# Patient Record
Sex: MALE | Race: WHITE | NOT HISPANIC OR LATINO | Employment: FULL TIME | URBAN - METROPOLITAN AREA
[De-identification: names, ages, dates, MRNs, and addresses within clinical notes are randomized per-mention and may not be internally consistent; named-entity substitution may affect disease eponyms.]

---

## 2017-06-29 ENCOUNTER — APPOINTMENT (OUTPATIENT)
Dept: GENERAL RADIOLOGY | Facility: CLINIC | Age: 27
End: 2017-06-29
Attending: EMERGENCY MEDICINE
Payer: COMMERCIAL

## 2017-06-29 ENCOUNTER — HOSPITAL ENCOUNTER (EMERGENCY)
Facility: CLINIC | Age: 27
Discharge: HOME | End: 2017-06-29
Attending: EMERGENCY MEDICINE | Admitting: EMERGENCY MEDICINE
Payer: COMMERCIAL

## 2017-06-29 VITALS
OXYGEN SATURATION: 98 % | DIASTOLIC BLOOD PRESSURE: 85 MMHG | SYSTOLIC BLOOD PRESSURE: 122 MMHG | RESPIRATION RATE: 14 BRPM

## 2017-06-29 DIAGNOSIS — R07.89 ATYPICAL CHEST PAIN: ICD-10-CM

## 2017-06-29 DIAGNOSIS — M79.652 PAIN OF LEFT THIGH: ICD-10-CM

## 2017-06-29 LAB
ALBUMIN SERPL-MCNC: 4.5 G/DL (ref 3.4–5)
ALP SERPL-CCNC: 82 U/L (ref 40–150)
ALT SERPL W P-5'-P-CCNC: 55 U/L (ref 0–70)
ANION GAP SERPL CALCULATED.3IONS-SCNC: 8 MMOL/L (ref 3–14)
AST SERPL W P-5'-P-CCNC: 25 U/L (ref 0–45)
BASOPHILS # BLD AUTO: 0 10E9/L (ref 0–0.2)
BASOPHILS NFR BLD AUTO: 0.2 %
BILIRUB SERPL-MCNC: 0.6 MG/DL (ref 0.2–1.3)
BUN SERPL-MCNC: 12 MG/DL (ref 7–30)
CALCIUM SERPL-MCNC: 9.7 MG/DL (ref 8.5–10.1)
CHLORIDE SERPL-SCNC: 105 MMOL/L (ref 94–109)
CK SERPL-CCNC: 122 U/L (ref 30–300)
CO2 SERPL-SCNC: 27 MMOL/L (ref 20–32)
CREAT SERPL-MCNC: 0.74 MG/DL (ref 0.66–1.25)
DIFFERENTIAL METHOD BLD: NORMAL
EOSINOPHIL # BLD AUTO: 0.1 10E9/L (ref 0–0.7)
EOSINOPHIL NFR BLD AUTO: 1.5 %
ERYTHROCYTE [DISTWIDTH] IN BLOOD BY AUTOMATED COUNT: 12.2 % (ref 10–15)
GFR SERPL CREATININE-BSD FRML MDRD: NORMAL ML/MIN/1.7M2
GLUCOSE SERPL-MCNC: 83 MG/DL (ref 70–99)
HCT VFR BLD AUTO: 43.4 % (ref 40–53)
HGB BLD-MCNC: 14.7 G/DL (ref 13.3–17.7)
IMM GRANULOCYTES # BLD: 0 10E9/L (ref 0–0.4)
IMM GRANULOCYTES NFR BLD: 0.1 %
LIPASE SERPL-CCNC: 101 U/L (ref 73–393)
LYMPHOCYTES # BLD AUTO: 2.8 10E9/L (ref 0.8–5.3)
LYMPHOCYTES NFR BLD AUTO: 31.6 %
MCH RBC QN AUTO: 30 PG (ref 26.5–33)
MCHC RBC AUTO-ENTMCNC: 33.9 G/DL (ref 31.5–36.5)
MCV RBC AUTO: 89 FL (ref 78–100)
MONOCYTES # BLD AUTO: 0.7 10E9/L (ref 0–1.3)
MONOCYTES NFR BLD AUTO: 8.2 %
NEUTROPHILS # BLD AUTO: 5.2 10E9/L (ref 1.6–8.3)
NEUTROPHILS NFR BLD AUTO: 58.4 %
PLATELET # BLD AUTO: 253 10E9/L (ref 150–450)
POTASSIUM SERPL-SCNC: 3.8 MMOL/L (ref 3.4–5.3)
PROT SERPL-MCNC: 7.9 G/DL (ref 6.8–8.8)
RBC # BLD AUTO: 4.9 10E12/L (ref 4.4–5.9)
SODIUM SERPL-SCNC: 140 MMOL/L (ref 133–144)
TROPONIN I SERPL-MCNC: NORMAL UG/L (ref 0–0.04)
WBC # BLD AUTO: 8.9 10E9/L (ref 4–11)

## 2017-06-29 PROCEDURE — 25000132 ZZH RX MED GY IP 250 OP 250 PS 637: Performed by: EMERGENCY MEDICINE

## 2017-06-29 PROCEDURE — 84484 ASSAY OF TROPONIN QUANT: CPT | Performed by: EMERGENCY MEDICINE

## 2017-06-29 PROCEDURE — 80053 COMPREHEN METABOLIC PANEL: CPT | Performed by: EMERGENCY MEDICINE

## 2017-06-29 PROCEDURE — 83690 ASSAY OF LIPASE: CPT | Performed by: EMERGENCY MEDICINE

## 2017-06-29 PROCEDURE — 93005 ELECTROCARDIOGRAM TRACING: CPT

## 2017-06-29 PROCEDURE — 71020 XR CHEST 2 VW: CPT

## 2017-06-29 PROCEDURE — 85025 COMPLETE CBC W/AUTO DIFF WBC: CPT | Performed by: EMERGENCY MEDICINE

## 2017-06-29 PROCEDURE — 93010 ELECTROCARDIOGRAM REPORT: CPT | Performed by: EMERGENCY MEDICINE

## 2017-06-29 PROCEDURE — 99285 EMERGENCY DEPT VISIT HI MDM: CPT | Mod: 25

## 2017-06-29 PROCEDURE — 99284 EMERGENCY DEPT VISIT MOD MDM: CPT | Mod: 25 | Performed by: EMERGENCY MEDICINE

## 2017-06-29 PROCEDURE — 82550 ASSAY OF CK (CPK): CPT | Performed by: EMERGENCY MEDICINE

## 2017-06-29 RX ORDER — KETOROLAC TROMETHAMINE 30 MG/ML
30 INJECTION, SOLUTION INTRAMUSCULAR; INTRAVENOUS ONCE
Status: DISCONTINUED | OUTPATIENT
Start: 2017-06-29 | End: 2017-06-29 | Stop reason: CLARIF

## 2017-06-29 RX ORDER — ASPIRIN 81 MG/1
324 TABLET, CHEWABLE ORAL ONCE
Status: COMPLETED | OUTPATIENT
Start: 2017-06-29 | End: 2017-06-29

## 2017-06-29 RX ADMIN — ASPIRIN 81 MG 324 MG: 81 TABLET ORAL at 19:31

## 2017-06-29 ASSESSMENT — ENCOUNTER SYMPTOMS
WHEEZING: 0
FEVER: 0
CHEST TIGHTNESS: 0
TROUBLE SWALLOWING: 0
PALPITATIONS: 0
ABDOMINAL PAIN: 0
VOMITING: 0
DYSURIA: 0
NAUSEA: 0
SHORTNESS OF BREATH: 0
STRIDOR: 0
CHILLS: 0

## 2017-06-29 NOTE — ED AVS SNAPSHOT
[facility]    [address] [facility] [address]    Phone:  [phone]    Fax:  [phone]                                       Nicolás Baptiste   MRN: [id number]    Department:  [facility]   Date of Visit:  6/29/2017           Patient Information     Date Of Birth          1990        Your diagnoses for this visit were:     Atypical chest pain     Pain of left thigh        You were seen by Kirit Black MD and Kirit Land MD.      Follow-up Information     Follow up with Primary DrSolange MD.    Why:  As needed        Follow up with [facility].    Specialty:  EMERGENCY MEDICINE    Why:  If symptoms worsen    Contact information:    [address] [place] [id number] [phone]    Additional information:    The medical center is located at   [address] [facility]. (between [address] and   [address] in [place], [place] north   of [place]).        Discharge Instructions         Return if symptoms worsen or new symptoms develop.  Follow-up with primary care next week for recheck and possible further assessment if symptoms continue.  Any increased chest pain shortness of breath focal numbness weakness in any extremity or other symptoms present please return for further evaluation and care.     *CHEST PAIN, UNCERTAIN CAUSE    Based on your exam today, the exact cause of your chest pain is not certain. Your condition does not seem serious at this time, and your pain does not appear to be coming from your heart. However, sometimes the signs of a serious problem take more time to appear. Therefore, watch for the warning signs listed below.  HOME CARE:  1. Rest today and avoid strenuous activity.  2. Take any prescribed medicine as directed.  FOLLOW UP with your doctor in 1-3 days.   GET PROMPT MEDICAL ATTENTION if any of the following occur:    A change in the type of pain: if it feels different,  becomes more severe, lasts longer, or begins to spread into your shoulder, arm, neck, jaw or back    Shortness of breath or increased pain with breathing    Weakness, dizziness, or fainting    Cough with blood or dark colored sputum (phlegm)    Fever over 101  F (38.3  C)    Swelling, pain or redness in one leg    [address] [place] [address]. All rights reserved. This information is not intended as a substitute for professional medical care. Always follow your healthcare professional's instructions.    *CHEST PAIN, NONCARDIAC    Based on your visit today, the exact cause of your chest pain is not certain. Your condition does not seem serious and your pain does not appear to be coming from your heart. However, sometimes the signs of a serious problem take more time to appear. Therefore, please watch for the warning signs listed below.  HOME CARE:  3. Rest today and avoid strenuous activity.  4. Take any prescribed medicine as directed.  FOLLOW UP with your doctor in 1-3 days.   GET PROMPT MEDICAL ATTENTION if any of the following occur:    A change in the type of pain: if it feels different, becomes more severe, lasts longer, or begins to spread into your shoulder, arm, neck, jaw or back    Shortness of breath or increased pain with breathing    Cough with blood or dark colored sputum (phlegm)    Weakness, dizziness, or fainting    Fever over 101  F (38.3  C)    Swelling, pain or redness in one leg    [address] [place] [address]. All rights reserved. This information is not intended as a substitute for professional medical care. Always follow your healthcare professional's instructions.      24 Hour Appointment Hotline       To make an appointment at any [facility], call [phone] ([phone]). If you don't have a family doctor or clinic, we will help you find one. [facility] are conveniently located to serve the  needs of you and your family.             Review of your medicines      Notice     You have not been prescribed any medications.            Procedures and tests performed during your visit     CBC with platelets, differential    CK total    Chest XR,  PA & LAT    Comprehensive metabolic panel    EKG 12 lead    Lipase    Troponin I      Orders Needing Specimen Collection     None      Pending Results     No orders found from 6/27/2017 to 6/30/2017.            Pending Culture Results     No orders found from 6/27/2017 to 6/30/2017.            Pending Results Instructions     If you had any lab results that were not finalized at the time of your Discharge, you can call the ED Lab Result RN at [phone]. You will be contacted by this team for any positive Lab results or changes in treatment. The nurses are available 7 days a week from 10A to 6:30P.  You can leave a message 24 hours per day and they will return your call.        Test Results From Your Hospital Stay        6/29/2017  7:41 PM      Component Results     Component Value Ref Range & Units Status    WBC 8.9 4.0 - 11.0 10e9/L Final    RBC Count 4.90 4.4 - 5.9 10e12/L Final    Hemoglobin 14.7 13.3 - 17.7 g/dL Final    Hematocrit 43.4 40.0 - 53.0 % Final    MCV 89 78 - 100 fl Final    MCH 30.0 26.5 - 33.0 pg Final    MCHC 33.9 31.5 - 36.5 g/dL Final    RDW 12.2 10.0 - 15.0 % Final    Platelet Count 253 150 - 450 10e9/L Final    Diff Method Automated Method  Final    % Neutrophils 58.4 % Final    % Lymphocytes 31.6 % Final    % Monocytes 8.2 % Final    % Eosinophils 1.5 % Final    % Basophils 0.2 % Final    % Immature Granulocytes 0.1 % Final    Absolute Neutrophil 5.2 1.6 - 8.3 10e9/L Final    Absolute Lymphocytes 2.8 0.8 - 5.3 10e9/L Final    Absolute Monocytes 0.7 0.0 - 1.3 10e9/L Final    Absolute Eosinophils 0.1 0.0 - 0.7 10e9/L Final    Absolute Basophils 0.0 0.0 - 0.2 10e9/L Final    Abs Immature Granulocytes 0.0 0 - 0.4 10e9/L Final         6/29/2017   8:00 PM      Component Results     Component Value Ref Range & Units Status    Sodium 140 133 - 144 mmol/L Final    Potassium 3.8 3.4 - 5.3 mmol/L Final    Chloride 105 94 - 109 mmol/L Final    Carbon Dioxide 27 20 - 32 mmol/L Final    Anion Gap 8 3 - 14 mmol/L Final    Glucose 83 70 - 99 mg/dL Final    Urea Nitrogen 12 7 - 30 mg/dL Final    Creatinine 0.74 0.66 - 1.25 mg/dL Final    GFR Estimate >90  Non  GFR Calc   >60 mL/min/1.7m2 Final    GFR Estimate If Black >90   GFR Calc   >60 mL/min/1.7m2 Final    Calcium 9.7 8.5 - 10.1 mg/dL Final    Bilirubin Total 0.6 0.2 - 1.3 mg/dL Final    Albumin 4.5 3.4 - 5.0 g/dL Final    Protein Total 7.9 6.8 - 8.8 g/dL Final    Alkaline Phosphatase 82 40 - 150 U/L Final    ALT 55 0 - 70 U/L Final    AST 25 0 - 45 U/L Final         6/29/2017  8:00 PM      Component Results     Component Value Ref Range & Units Status    Lipase 101 73 - 393 U/L Final         6/29/2017  8:00 PM      Component Results     Component Value Ref Range & Units Status    Troponin I ES  0.000 - 0.045 ug/L Final    <0.015  The 99th percentile for upper reference range is 0.045 ug/L.  Troponin values in   the range of 0.045 - 0.120 ug/L may be associated with risks of adverse   clinical events.           6/29/2017  8:02 PM      Narrative     CHEST TWO VIEWS    6/29/2017 7:55 PM     INDICATION: Chest pain.    COMPARISON: None.        Impression     IMPRESSION: No infiltrates or other acute findings. Heart size is  within normal limits. No pneumothorax.     MYRIAM MARTÍNEZ MD         6/29/2017  8:00 PM      Component Results     Component Value Ref Range & Units Status    CK Total 122 30 - 300 U/L Final                Thank you for choosing [facility]       Thank you for choosing [facility] for your care. Our goal is always to provide you with excellent care. Hearing back from our patients is one way we can continue to improve our services. Please take a few minutes to complete the  "written survey that you may receive in the mail after you visit with us. Thank you!        [employer] Information     [employer] lets you send messages to your doctor, view your test results, renew your prescriptions, schedule appointments and more. To sign up, go to [email or web address] . Click on \"Log in\" on the left side of the screen, which will take you to the Welcome page. Then click on \"Sign up Now\" on the right side of the page.     You will be asked to enter the access code listed below, as well as some personal information. Please follow the directions to create your username and password.     Your access code is: [email or web address]  Expires: 2017  8:44 PM     Your access code will  in 90 days. If you need help or a new code, please call your [facility] clinic or [phone].        Care EveryWhere ID     This is your Care EveryWhere ID. This could be used by other organizations to access your [facility] medical records  [id number]        Equal Access to Services     [place] : Hadcristian mcfarlando Sorg, waaxda luqadaha, qaybta kaalmagrant oro, rashi cali . So [facility] [phone].    ATENCIÓN: Si habla español, tiene a escobar disposición servicios gratuitos de asistencia lingüística. Llame al [phone].    We comply with applicable federal civil rights laws and [place] laws. We do not discriminate on the basis of race, color, national origin, age, disability sex, sexual orientation or gender identity.            After Visit Summary       This is your record. Keep this with you and show to your community pharmacist(s) and doctor(s) at your next visit.                  "

## 2017-06-29 NOTE — ED AVS SNAPSHOT
[facility]    [address] [facility] [address]    Phone:  [phone]    Fax:  [phone]                                       Nicolás Baptiste   MRN: [id number]    Department:  [facility]   Date of Visit:  6/29/2017           After Visit Summary Signature Page     I have received my discharge instructions, and my questions have been answered. I have discussed any challenges I see with this plan with the nurse or doctor.    ..........................................................................................................................................  Patient/Patient Representative Signature      ..........................................................................................................................................  Patient Representative Print Name and Relationship to Patient    ..................................................               ................................................  Date                                            Time    ..........................................................................................................................................  Reviewed by Signature/Title    ...................................................              ..............................................  Date                                                            Time

## 2017-06-30 NOTE — DISCHARGE INSTRUCTIONS
Return if symptoms worsen or new symptoms develop.  Follow-up with primary care next week for recheck and possible further assessment if symptoms continue.  Any increased chest pain shortness of breath focal numbness weakness in any extremity or other symptoms present please return for further evaluation and care.     *CHEST PAIN, UNCERTAIN CAUSE    Based on your exam today, the exact cause of your chest pain is not certain. Your condition does not seem serious at this time, and your pain does not appear to be coming from your heart. However, sometimes the signs of a serious problem take more time to appear. Therefore, watch for the warning signs listed below.  HOME CARE:  1. Rest today and avoid strenuous activity.  2. Take any prescribed medicine as directed.  FOLLOW UP with your doctor in 1-3 days.   GET PROMPT MEDICAL ATTENTION if any of the following occur:    A change in the type of pain: if it feels different, becomes more severe, lasts longer, or begins to spread into your shoulder, arm, neck, jaw or back    Shortness of breath or increased pain with breathing    Weakness, dizziness, or fainting    Cough with blood or dark colored sputum (phlegm)    Fever over 101  F (38.3  C)    Swelling, pain or redness in one leg    [address] [place] [address]. All rights reserved. This information is not intended as a substitute for professional medical care. Always follow your healthcare professional's instructions.    *CHEST PAIN, NONCARDIAC    Based on your visit today, the exact cause of your chest pain is not certain. Your condition does not seem serious and your pain does not appear to be coming from your heart. However, sometimes the signs of a serious problem take more time to appear. Therefore, please watch for the warning signs listed below.  HOME CARE:  3. Rest today and avoid strenuous activity.  4. Take any prescribed medicine as directed.  FOLLOW UP with your  doctor in 1-3 days.   GET PROMPT MEDICAL ATTENTION if any of the following occur:    A change in the type of pain: if it feels different, becomes more severe, lasts longer, or begins to spread into your shoulder, arm, neck, jaw or back    Shortness of breath or increased pain with breathing    Cough with blood or dark colored sputum (phlegm)    Weakness, dizziness, or fainting    Fever over 101  F (38.3  C)    Swelling, pain or redness in one leg    [address] [place] [address]. All rights reserved. This information is not intended as a substitute for professional medical care. Always follow your healthcare professional's instructions.

## 2017-06-30 NOTE — ED PROVIDER NOTES
History   No chief complaint on file.    HPI  Nicolás Baptiste is a 27 year old male who resents emergency department complaining of chest pain and left leg pain.  Patient has no significant past medical history he states over the last week has had intermittent centralized chest pain.  Pain is an aching pain that lasts for 2-3 minutes and then resolves.  It does not radiate.  It is not worsened with movement or deep breathing.  Patient denies any significant shortness of breath.  He has a mild cough.  He denies any fevers chills.  He's not had any visual changes.  He denies any back pain.  He is not having any abdominal pain.  He denies any nausea vomiting or diarrhea.  He has not had any focal numbness weakness in the extremity.  Patient is a smoker but denies hypertension hyperlipidemia diabetes or significant family cardiac history.  Patient currently denies any significant pain.  The pain in his leg is a cramping pain in the anterior proximal thigh region.  This is most present after sitting for some time.  He describes it as cramping .  Denies any calf tenderness or redness in his legs.  Denies any weakness or numbness.      I have reviewed the Medications, Allergies, Past Medical and Surgical History, and Social History in the Epic system.    Allergies: No Known Allergies      No current facility-administered medications on file prior to encounter.   Current Outpatient Prescriptions on File Prior to Encounter:  Testosterone (ANDROGEL TD) Place onto the skin daily   ondansetron (ZOFRAN ODT) 4 MG disintegrating tablet Take 1-2 tablets (4-8 mg) by mouth every 8 hours as needed for nausea       Patient Active Problem List   Diagnosis     CARDIOVASCULAR SCREENING; LDL GOAL LESS THAN 130       Past Surgical History:   Procedure Laterality Date     ENT SURGERY       GENITOURINARY SURGERY         Social History   Substance Use Topics     Smoking status: Current Every Day Smoker     Types: Cigarettes     Smokeless  "tobacco: Never Used     Alcohol use Yes      Comment: occasional       Most Recent Immunizations   Administered Date(s) Administered     TDAP Vaccine (Adacel) 10/31/2012       BMI: Estimated body mass index is 32.32 kg/(m^2) as calculated from the following:    Height as of 1/21/16: 1.835 m (6' 0.25\").    Weight as of 1/21/16: 108.9 kg (240 lb).      Review of Systems   Constitutional: Negative for chills and fever.   HENT: Negative for congestion and trouble swallowing.    Eyes: Negative for visual disturbance.   Respiratory: Negative for chest tightness, shortness of breath, wheezing and stridor.    Cardiovascular: Negative for chest pain, palpitations and leg swelling.   Gastrointestinal: Negative for abdominal pain, nausea and vomiting.   Genitourinary: Negative for dysuria.   Musculoskeletal: Negative for back pain and neck pain.   Skin: Negative for rash.   Neurological: Negative for headaches.   Hematological: Does not bruise/bleed easily.   Psychiatric/Behavioral: Negative for confusion.       Physical Exam      Physical Exam   Constitutional: He appears well-developed and well-nourished.   HENT:   Head: Normocephalic.   Eyes: Conjunctivae are normal.   Cardiovascular: Normal rate, regular rhythm, normal heart sounds and intact distal pulses.    No murmur heard.  Pulmonary/Chest: Effort normal and breath sounds normal. He has no wheezes. He has no rales.   No reproducible chest pain. There is no erythema or edema present.    Musculoskeletal: Normal range of motion. He exhibits no tenderness.   No calf tenderness is present. No reproducible thigh pain No erythema or edmea of the medial thigh.    Neurological: He is alert. He exhibits normal muscle tone. Coordination normal.   Skin: Skin is warm and dry. No rash noted.   Psychiatric: He has a normal mood and affect.   Nursing note and vitals reviewed.      ED Course     ED Course     Procedures             EKG Interpretation:      Interpreted by Kirit Viera " Drage  Rhythm: normal sinus   Rate: Normal  Axis: Normal  Ectopy: none  Conduction: normal  ST Segments/ T Waves: Non-specific ST-T wave changes  Q Waves: none  Comparison to prior: No old EKG available    Clinical Impression: NSR with nonspecific st t'wave changes.                Critical Care time:  none               Labs Ordered and Resulted from Time of ED Arrival Up to the Time of Departure from the ED   CBC WITH PLATELETS DIFFERENTIAL   COMPREHENSIVE METABOLIC PANEL   LIPASE   TROPONIN I   CK TOTAL     Results for orders placed or performed during the hospital encounter of 06/29/17   Chest XR,  PA & LAT    Narrative    CHEST TWO VIEWS    6/29/2017 7:55 PM     INDICATION: Chest pain.    COMPARISON: None.      Impression    IMPRESSION: No infiltrates or other acute findings. Heart size is  within normal limits. No pneumothorax.     MYRIAM MARTÍNEZ MD         Assessments & Plan (with Medical Decision Making)Labs ekg and cxr were obtained. EKG with non specific qrs and st-wave changes. CXR was unremarkable. White count is not elevated and there is not a l shift. Comprehensive metabolic panel is unremarkable. Troponin and CK are within normal limits. I considered a PE. But patient is not short of breath and has oxygen saturations in the upper nineties. He has no risk factors . I do not think he has a PE. Findings discussed with patient and father. HE will return if symptoms worsen or new symptoms develop. He will follow up with primary care for further evaluation. If any worsening chest pain or any SOB or leg swelling or pain he will return for further evaluation and care. I have considered numerous causes of chest pain including ; ACS, MI , pneumonia, PE, pneumothorax, pleurisy, myocarditis, pericarditis, GERD, PUD, musculoskeletal .      I have reviewed the nursing notes.    I have reviewed the findings, diagnosis, plan and need for follow up with the patient.       There are no discharge medications for this  patient.      Final diagnoses:   Atypical chest pain   Pain of left thigh       6/29/2017   [facility] EMERGENCY DEPARTMENT     Kirit Land MD  07/02/17 [id number]

## 2017-07-02 ASSESSMENT — ENCOUNTER SYMPTOMS
CONFUSION: 0
BACK PAIN: 0
HEADACHES: 0
BRUISES/BLEEDS EASILY: 0
NECK PAIN: 0

## 2024-04-18 ENCOUNTER — OFFICE VISIT (OUTPATIENT)
Dept: FAMILY MEDICINE | Facility: CLINIC | Age: 34
End: 2024-04-18
Payer: COMMERCIAL

## 2024-04-18 VITALS
DIASTOLIC BLOOD PRESSURE: 82 MMHG | TEMPERATURE: 98 F | SYSTOLIC BLOOD PRESSURE: 120 MMHG | RESPIRATION RATE: 18 BRPM | WEIGHT: 263 LBS | OXYGEN SATURATION: 99 % | HEIGHT: 72 IN | HEART RATE: 85 BPM | BODY MASS INDEX: 35.62 KG/M2

## 2024-04-18 DIAGNOSIS — E29.1 HYPOGONADISM MALE: Primary | ICD-10-CM

## 2024-04-18 PROCEDURE — 99203 OFFICE O/P NEW LOW 30 MIN: CPT | Performed by: FAMILY MEDICINE

## 2024-04-18 ASSESSMENT — PAIN SCALES - GENERAL: PAINLEVEL: NO PAIN (0)

## 2024-04-18 NOTE — NURSING NOTE
"Chief Complaint   Patient presents with    Endocrine Problem     /82 (Cuff Size: Adult Large)   Pulse 85   Temp 98  F (36.7  C) (Tympanic)   Resp 18   Ht 1.835 m (6' 0.25\")   Wt 119.3 kg (263 lb)   SpO2 99%   BMI 35.42 kg/m   Estimated body mass index is 35.42 kg/m  as calculated from the following:    Height as of this encounter: 1.835 m (6' 0.25\").    Weight as of this encounter: 119.3 kg (263 lb).  Patient presents to the clinic using No DME      Health Maintenance that is potentially due pending provider review:    There are no preventive care reminders to display for this patient.               "

## 2024-04-18 NOTE — PROGRESS NOTES
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  Patient was on testosterone replacement however lost endocrinology follow-ups.  Testosterone levels ordered and endocrinology referral placed.  Recommended regular exercise, healthy diet and weight loss  - Adult Endocrinology  Referral; Future  - Testosterone Free and Total; Future      BMI  Estimated body mass index is 35.42 kg/m  as calculated from the following:    Height as of this encounter: 1.835 m (6' 0.25\").    Weight as of this encounter: 119.3 kg (263 lb).   Weight management plan: Discussed healthy diet and exercise guidelines        Yoko Monzon is a 34 year old, presenting for the following health issues:  Endocrine Problem    History of Present Illness       Reason for visit:  Referal for an endocrinologist    He eats 2-3 servings of fruits and vegetables daily.He consumes 1 sweetened beverage(s) daily.He exercises with enough effort to increase his heart rate 9 or less minutes per day.  He exercises with enough effort to increase his heart rate 3 or less days per week.   He is taking medications regularly.  Testes twisted when patient was born, both testicles were removed and placed with artificial ones.       Review of Systems  Constitutional, HEENT, cardiovascular, pulmonary, gi and gu systems are negative, except as otherwise noted.        Objective    /82 (Cuff Size: Adult Large)   Pulse 85   Temp 98  F (36.7  C) (Tympanic)   Resp 18   Ht 1.835 m (6' 0.25\")   Wt 119.3 kg (263 lb)   SpO2 99%   BMI 35.42 kg/m    Body mass index is 35.42 kg/m .  Physical Exam   GENERAL: alert and no distress  EYES: Eyes grossly normal to inspection, PERRL and conjunctivae and sclerae normal  HENT: normal cephalic/atraumatic, nose and mouth without ulcers or lesions, oropharynx clear, and oral mucous membranes moist  NECK: no adenopathy, " no asymmetry, masses, or scars  RESP: lungs clear to auscultation - no rales, rhonchi or wheezes  CV: regular rate and rhythm, normal S1 S2, no S3 or S4, no murmur, click or rub, no peripheral edema  MS: no gross musculoskeletal defects noted, no edema  NEURO: Normal strength and tone, mentation intact and speech normal  PSYCH: mentation appears normal, affect normal/bright        Signed Electronically by: John George MD

## 2024-04-27 ENCOUNTER — LAB (OUTPATIENT)
Dept: LAB | Facility: CLINIC | Age: 34
End: 2024-04-27
Payer: COMMERCIAL

## 2024-04-27 DIAGNOSIS — E29.1 HYPOGONADISM MALE: ICD-10-CM

## 2024-04-27 LAB — SHBG SERPL-SCNC: 16 NMOL/L (ref 11–80)

## 2024-04-27 PROCEDURE — 36415 COLL VENOUS BLD VENIPUNCTURE: CPT

## 2024-04-27 PROCEDURE — 84270 ASSAY OF SEX HORMONE GLOBUL: CPT

## 2024-04-27 PROCEDURE — 84403 ASSAY OF TOTAL TESTOSTERONE: CPT

## 2024-04-30 LAB
TESTOST FREE SERPL-MCNC: 0.2 NG/DL
TESTOST SERPL-MCNC: 8 NG/DL (ref 240–950)